# Patient Record
Sex: MALE | Race: WHITE | NOT HISPANIC OR LATINO | Employment: OTHER | URBAN - METROPOLITAN AREA
[De-identification: names, ages, dates, MRNs, and addresses within clinical notes are randomized per-mention and may not be internally consistent; named-entity substitution may affect disease eponyms.]

---

## 2017-06-08 ENCOUNTER — HOSPITAL ENCOUNTER (OUTPATIENT)
Facility: CLINIC | Age: 52
End: 2017-06-08
Attending: OPHTHALMOLOGY | Admitting: OPHTHALMOLOGY
Payer: COMMERCIAL

## 2017-07-17 RX ORDER — FLUTICASONE PROPIONATE 50 MCG
2 SPRAY, SUSPENSION (ML) NASAL DAILY
COMMUNITY

## 2017-07-17 RX ORDER — CETIRIZINE HYDROCHLORIDE 10 MG/1
10 TABLET ORAL DAILY
COMMUNITY

## 2017-07-17 RX ORDER — ALBUTEROL SULFATE 90 UG/1
2 AEROSOL, METERED RESPIRATORY (INHALATION) EVERY 4 HOURS PRN
COMMUNITY

## 2017-07-17 RX ORDER — HYDROCODONE BITARTRATE AND ACETAMINOPHEN 5; 325 MG/1; MG/1
1-2 TABLET ORAL EVERY 6 HOURS PRN
COMMUNITY

## 2017-07-18 VITALS — HEIGHT: 67 IN | BODY MASS INDEX: 28.62 KG/M2 | WEIGHT: 182.32 LBS

## 2017-07-20 ENCOUNTER — SURGERY (OUTPATIENT)
Age: 52
End: 2017-07-20

## 2017-07-20 ENCOUNTER — ANESTHESIA (OUTPATIENT)
Dept: SURGERY | Facility: CLINIC | Age: 52
End: 2017-07-20
Payer: COMMERCIAL

## 2017-07-20 ENCOUNTER — HOSPITAL ENCOUNTER (OUTPATIENT)
Facility: CLINIC | Age: 52
Discharge: HOME | End: 2017-07-20
Attending: OPHTHALMOLOGY | Admitting: OPHTHALMOLOGY
Payer: COMMERCIAL

## 2017-07-20 ENCOUNTER — ANESTHESIA EVENT (OUTPATIENT)
Dept: SURGERY | Facility: CLINIC | Age: 52
End: 2017-07-20
Payer: COMMERCIAL

## 2017-07-20 ENCOUNTER — APPOINTMENT (OUTPATIENT)
Dept: ADMISSION | Facility: CLINIC | Age: 52
End: 2017-07-20
Attending: OPHTHALMOLOGY
Payer: COMMERCIAL

## 2017-07-20 VITALS
BODY MASS INDEX: 28.56 KG/M2 | TEMPERATURE: 97.7 F | OXYGEN SATURATION: 98 % | DIASTOLIC BLOOD PRESSURE: 90 MMHG | HEIGHT: 67 IN | WEIGHT: 182 LBS | SYSTOLIC BLOOD PRESSURE: 124 MMHG | RESPIRATION RATE: 14 BRPM

## 2017-07-20 PROCEDURE — 36000135 ZZH KERATOTOMY ARCUATE W FEMTOSECOND LASER/IMAGING FOR ATIOL: Performed by: OPHTHALMOLOGY

## 2017-07-20 PROCEDURE — 71000028 ZZH EYE RECOVERY PHASE 2 EACH 15 MINS: Performed by: OPHTHALMOLOGY

## 2017-07-20 PROCEDURE — V2788 PRESBYOPIA-CORRECT FUNCTION: HCPCS | Performed by: OPHTHALMOLOGY

## 2017-07-20 PROCEDURE — 40000170 ZZH STATISTIC PRE-PROCEDURE ASSESSMENT II: Performed by: OPHTHALMOLOGY

## 2017-07-20 PROCEDURE — V2632 POST CHMBR INTRAOCULAR LENS: HCPCS | Performed by: OPHTHALMOLOGY

## 2017-07-20 PROCEDURE — 25000125 ZZHC RX 250: Performed by: OPHTHALMOLOGY

## 2017-07-20 PROCEDURE — 27210794 ZZH OR GENERAL SUPPLY STERILE: Performed by: OPHTHALMOLOGY

## 2017-07-20 PROCEDURE — 37000008 ZZH ANESTHESIA TECHNICAL FEE, 1ST 30 MIN: Performed by: OPHTHALMOLOGY

## 2017-07-20 PROCEDURE — 25000128 H RX IP 250 OP 636: Performed by: NURSE ANESTHETIST, CERTIFIED REGISTERED

## 2017-07-20 PROCEDURE — 25000128 H RX IP 250 OP 636: Performed by: ANESTHESIOLOGY

## 2017-07-20 PROCEDURE — 25000125 ZZHC RX 250: Performed by: NURSE ANESTHETIST, CERTIFIED REGISTERED

## 2017-07-20 PROCEDURE — 36000101 ZZH EYE SURGERY LEVEL 3 1ST 30 MIN: Performed by: OPHTHALMOLOGY

## 2017-07-20 PROCEDURE — 25000128 H RX IP 250 OP 636: Performed by: OPHTHALMOLOGY

## 2017-07-20 DEVICE — IMPLANTABLE DEVICE: Type: IMPLANTABLE DEVICE | Site: EYE | Status: FUNCTIONAL

## 2017-07-20 RX ORDER — PROPOFOL 10 MG/ML
INJECTION, EMULSION INTRAVENOUS PRN
Status: DISCONTINUED | OUTPATIENT
Start: 2017-07-20 | End: 2017-07-20

## 2017-07-20 RX ORDER — PROPARACAINE HYDROCHLORIDE 5 MG/ML
1 SOLUTION/ DROPS OPHTHALMIC ONCE
Status: COMPLETED | OUTPATIENT
Start: 2017-07-20 | End: 2017-07-20

## 2017-07-20 RX ORDER — DICLOFENAC SODIUM 1 MG/ML
1 SOLUTION/ DROPS OPHTHALMIC
Status: COMPLETED | OUTPATIENT
Start: 2017-07-20 | End: 2017-07-20

## 2017-07-20 RX ORDER — BALANCED SALT SOLUTION 6.4; .75; .48; .3; 3.9; 1.7 MG/ML; MG/ML; MG/ML; MG/ML; MG/ML; MG/ML
SOLUTION OPHTHALMIC PRN
Status: DISCONTINUED | OUTPATIENT
Start: 2017-07-20 | End: 2017-07-20 | Stop reason: HOSPADM

## 2017-07-20 RX ORDER — PROPARACAINE HYDROCHLORIDE 5 MG/ML
SOLUTION/ DROPS OPHTHALMIC PRN
Status: DISCONTINUED | OUTPATIENT
Start: 2017-07-20 | End: 2017-07-20 | Stop reason: HOSPADM

## 2017-07-20 RX ORDER — LIDOCAINE 40 MG/G
CREAM TOPICAL
Status: DISCONTINUED | OUTPATIENT
Start: 2017-07-20 | End: 2017-07-20 | Stop reason: HOSPADM

## 2017-07-20 RX ORDER — SODIUM CHLORIDE, SODIUM LACTATE, POTASSIUM CHLORIDE, CALCIUM CHLORIDE 600; 310; 30; 20 MG/100ML; MG/100ML; MG/100ML; MG/100ML
500 INJECTION, SOLUTION INTRAVENOUS CONTINUOUS
Status: DISCONTINUED | OUTPATIENT
Start: 2017-07-20 | End: 2017-07-20 | Stop reason: HOSPADM

## 2017-07-20 RX ORDER — TETRACAINE HYDROCHLORIDE 5 MG/ML
SOLUTION OPHTHALMIC PRN
Status: DISCONTINUED | OUTPATIENT
Start: 2017-07-20 | End: 2017-07-20 | Stop reason: HOSPADM

## 2017-07-20 RX ORDER — LIDOCAINE HYDROCHLORIDE 10 MG/ML
INJECTION, SOLUTION EPIDURAL; INFILTRATION; INTRACAUDAL; PERINEURAL PRN
Status: DISCONTINUED | OUTPATIENT
Start: 2017-07-20 | End: 2017-07-20 | Stop reason: HOSPADM

## 2017-07-20 RX ORDER — TROPICAMIDE 10 MG/ML
1 SOLUTION/ DROPS OPHTHALMIC
Status: COMPLETED | OUTPATIENT
Start: 2017-07-20 | End: 2017-07-20

## 2017-07-20 RX ORDER — MOXIFLOXACIN 5 MG/ML
1 SOLUTION/ DROPS OPHTHALMIC
Status: COMPLETED | OUTPATIENT
Start: 2017-07-20 | End: 2017-07-20

## 2017-07-20 RX ORDER — PHENYLEPHRINE HYDROCHLORIDE 25 MG/ML
1 SOLUTION/ DROPS OPHTHALMIC
Status: COMPLETED | OUTPATIENT
Start: 2017-07-20 | End: 2017-07-20

## 2017-07-20 RX ADMIN — BALANCED SALT SOLUTION 15 ML: 6.4; .75; .48; .3; 3.9; 1.7 SOLUTION OPHTHALMIC at 10:10

## 2017-07-20 RX ADMIN — PROPARACAINE HYDROCHLORIDE 2 DROP: 5 SOLUTION/ DROPS OPHTHALMIC at 09:41

## 2017-07-20 RX ADMIN — PROPOFOL 10 MG: 10 INJECTION, EMULSION INTRAVENOUS at 10:08

## 2017-07-20 RX ADMIN — PHENYLEPHRINE HYDROCHLORIDE 1 DROP: 2.5 SOLUTION/ DROPS OPHTHALMIC at 08:02

## 2017-07-20 RX ADMIN — PROPARACAINE HYDROCHLORIDE 2 DROP: 5 SOLUTION/ DROPS OPHTHALMIC at 09:47

## 2017-07-20 RX ADMIN — TROPICAMIDE 1 DROP: 10 SOLUTION/ DROPS OPHTHALMIC at 08:02

## 2017-07-20 RX ADMIN — TETRACAINE HYDROCHLORIDE 1 DROP: 5 SOLUTION OPHTHALMIC at 10:13

## 2017-07-20 RX ADMIN — LIDOCAINE HYDROCHLORIDE 1 ML: 10 INJECTION, SOLUTION EPIDURAL; INFILTRATION; INTRACAUDAL; PERINEURAL at 10:12

## 2017-07-20 RX ADMIN — EPINEPHRINE 500 ML: 1 INJECTION, SOLUTION, CONCENTRATE INTRAVENOUS at 10:12

## 2017-07-20 RX ADMIN — MOXIFLOXACIN HYDROCHLORIDE 1 DROP: 5 SOLUTION/ DROPS OPHTHALMIC at 08:09

## 2017-07-20 RX ADMIN — DICLOFENAC SODIUM 1 DROP: 1 SOLUTION/ DROPS OPHTHALMIC at 08:02

## 2017-07-20 RX ADMIN — MIDAZOLAM HYDROCHLORIDE 2 MG: 1 INJECTION, SOLUTION INTRAMUSCULAR; INTRAVENOUS at 10:11

## 2017-07-20 RX ADMIN — MOXIFLOXACIN HYDROCHLORIDE 1 DROP: 5 SOLUTION/ DROPS OPHTHALMIC at 08:03

## 2017-07-20 RX ADMIN — BALANCED SALT SOLUTION 15 ML: 6.4; .75; .48; .3; 3.9; 1.7 SOLUTION OPHTHALMIC at 09:46

## 2017-07-20 RX ADMIN — EPINEPHRINE 0.5 ML: 1 INJECTION, SOLUTION, CONCENTRATE INTRAVENOUS at 10:12

## 2017-07-20 RX ADMIN — MOXIFLOXACIN HYDROCHLORIDE 1 DROP: 5 SOLUTION/ DROPS OPHTHALMIC at 07:49

## 2017-07-20 RX ADMIN — PROPARACAINE HYDROCHLORIDE 1 DROP: 5 SOLUTION/ DROPS OPHTHALMIC at 07:48

## 2017-07-20 RX ADMIN — DICLOFENAC SODIUM 1 DROP: 1 SOLUTION/ DROPS OPHTHALMIC at 08:09

## 2017-07-20 RX ADMIN — PHENYLEPHRINE HYDROCHLORIDE 1 DROP: 2.5 SOLUTION/ DROPS OPHTHALMIC at 08:09

## 2017-07-20 RX ADMIN — TROPICAMIDE 1 DROP: 10 SOLUTION/ DROPS OPHTHALMIC at 07:48

## 2017-07-20 RX ADMIN — PROPOFOL 30 MG: 10 INJECTION, EMULSION INTRAVENOUS at 09:59

## 2017-07-20 RX ADMIN — SODIUM CHLORIDE, SODIUM LACTATE, POTASSIUM CHLORIDE, CALCIUM CHLORIDE: 600; 310; 30; 20 INJECTION, SOLUTION INTRAVENOUS at 09:55

## 2017-07-20 RX ADMIN — Medication 0.8 ML: at 10:14

## 2017-07-20 RX ADMIN — TROPICAMIDE 1 DROP: 10 SOLUTION/ DROPS OPHTHALMIC at 08:09

## 2017-07-20 RX ADMIN — DICLOFENAC SODIUM 1 DROP: 1 SOLUTION/ DROPS OPHTHALMIC at 07:48

## 2017-07-20 RX ADMIN — PROPOFOL 10 MG: 10 INJECTION, EMULSION INTRAVENOUS at 10:06

## 2017-07-20 RX ADMIN — MIDAZOLAM HYDROCHLORIDE 2 MG: 1 INJECTION, SOLUTION INTRAMUSCULAR; INTRAVENOUS at 09:55

## 2017-07-20 RX ADMIN — PHENYLEPHRINE HYDROCHLORIDE 1 DROP: 2.5 SOLUTION/ DROPS OPHTHALMIC at 07:48

## 2017-07-20 NOTE — ANESTHESIA POSTPROCEDURE EVALUATION
Patient: Placido Dickerson    Procedure(s):  LEFT EYE FEMTOSECOND LASER ASSISTED, PHACOEMULSIFICATION CLEAR CORNEA WITH DELUXE  INTRAOCULAR LENS IMPLANT  - Wound Class: I-Clean    Diagnosis:LEFT EYE CATARACT  Diagnosis Additional Information: No value filed.    Anesthesia Type:  MAC    Note:  Anesthesia Post Evaluation    Patient location during evaluation: PACU  Patient participation: Able to fully participate in evaluation  Level of consciousness: awake  Airway patency: patent  Cardiovascular status: acceptable  Respiratory status: acceptable  Hydration status: acceptable     Anesthetic complications: None          Last vitals:  Vitals:    07/20/17 0754 07/20/17 1023 07/20/17 1049   BP: (!) 140/98 (!) 125/98 124/90   Resp: 16 12 14   Temp: 36.5  C (97.7  F)     SpO2: 99% 97% 98%         Electronically Signed By: Daija Stewart  July 20, 2017  3:29 PM

## 2017-07-20 NOTE — ANESTHESIA PREPROCEDURE EVALUATION
Anesthesia Evaluation     . Pt has had prior anesthetic.     No history of anesthetic complications          ROS/MED HX    ENT/Pulmonary:     (+)asthma , . .    Neurologic:     (+)neuropathy     Cardiovascular:     (+) --CAD, -past MI (subendocardial mi, stent, no current symptoms, occured in 2010),-stent,. : . . . :. .       METS/Exercise Tolerance:     Hematologic:         Musculoskeletal:         GI/Hepatic:     (+) GERD Symptomatic,       Renal/Genitourinary:         Endo:         Psychiatric:         Infectious Disease:         Malignancy:         Other: Comment: Patient denies allergy to lidocaine, gets at dentist all the time                    Physical Exam  Normal systems: dental    Airway   Mallampati: II  TM distance: >3 FB  Neck ROM: full    Dental     Cardiovascular   Rhythm and rate: regular      Pulmonary    breath sounds clear to auscultation                    Anesthesia Plan      History & Physical Review  History and physical reviewed and following examination; no interval change.    ASA Status:  3 .        Plan for MAC with Intravenous induction. Reason for MAC:  Procedure to face, neck, head or breast  PONV prophylaxis:  Ondansetron (or other 5HT-3)  Able to lie flat    Hx of reflux, poorly controlled.  No nighttime symptoms but does have morning symptoms, able to lie flat.  Told patient we would do sedation, but he would be semi awake    Patient okay with plan      Postoperative Care      Consents                          .

## 2017-07-20 NOTE — OR NURSING
"Pt very sleepy upon arrival to Phase II due to extra sedation her received in OR for restlessness.  Pt is still sleepy but mostly awake and appropriated and stated \"I just want to leave\" when asked if he wanted to eat now that he is awake or go home.  "

## 2017-07-20 NOTE — DISCHARGE INSTRUCTIONS
"Cataract Surgery Postoperative Instructions  Dr. Jay Acharya      Postoperative Medications: After surgery, you will use eye drop medications. In most cases, you will start these eye drops 2 days before the day of surgery.   Follow the directions provided to you from the pharmacy or from the doctor's office.    The drops might sting a little when they are instilled, and that is normal.    It doesn't matter what order you put the drops into your eyes, but you should wait at least one minute between drops.    Recently we started using a compounded drop that contains all three prescriptions in one bottle. If you have this drop you only need to use one drop 4 time per day. Many people choose to do the drops at breakfast, lunch, dinner, and bedtime.    Artificial Tears- Are lubricating drops used to moisturize the eye.  You can use these as much as you want.  Particularly if your eyes feel watery, gritty, or uncomfortable.  Chilling these drops in the refrigerator results in a more soothing feeling.  There are several brands of artificial tears available including, but not limited to, Optive, Refresh, Systane, Blink, Genteal, Soothe, and others.  You should not use drops that are \"gets the red out.\"  You do not need a prescription for these medications.     Please continue any glaucoma, dry eye, or other medications you were using prior to the surgery.      Please allow 24 to 48 hours when requesting refills, and call BEFORE you run out of drops.    Restriction on Activities-  It is extremely important that you DO NOT RUB THE TREATED EYE.    - You will be given a clear plastic shield to wear as protection over your eye the night after surgery.    - Refrain from many activities that may put your eye at risk of injury, as well as areas containing a high volume of chemicals, dust, and debris.    - Do Not wear any eye makeup or moisturizer around the eye for 1 week after surgery.    - Do Not swim or go into a hot-tub, " jacuzzi, or sauna for 1 week following surgery.  You can take showers as normal, but avoid getting shampoo or soap into your eyes.     - Avoid strenuous activity, including lifting more than 10 pounds, for 1 week after surgery.       - It is fine to bathe, read, watch TV, and use the computer.    Symptoms requiring medical attention:     - Sudden onset of increased discharge from the eye.  - Persistent or increasing pain in the eye.  - Sudden decrease in vision.  - Persistent nausea or vomiting.      If you have any questions or concerns before or after your surgery, please contact Dr. Acharya's office at [phone].         Revised 3/27/2017    [facility] Discharge  Instructions  Anesthesia ([facility])   Adult Discharge Instructions    For 24 hours after surgery    1. Get plenty of rest.  Make arrangements to have a responsible adult stay with you for at least 6 hours after you leave the hospital.  2. Do not drive or use heavy equipment for 24 hours.    3. Do not drink alcohol for 24 hours.  4. Do not sign legal documents or make important decisions for 24 hours.  5. Avoid strenuous or risky activities. You may feel lightheaded.  If so, sit for a few minutes before standing.  Have someone help you get up.   6. Conscious sedation patients may resume a regular diet..  7. Any questions of medical nature, call your physician.

## 2017-07-20 NOTE — ANESTHESIA CARE TRANSFER NOTE
Patient: Placido Dickerson    Procedure(s):  LEFT EYE FEMTOSECOND LASER ASSISTED, PHACOEMULSIFICATION CLEAR CORNEA WITH DELUXE  INTRAOCULAR LENS IMPLANT  - Wound Class: I-Clean    Diagnosis: LEFT EYE CATARACT  Diagnosis Additional Information: No value filed.    Anesthesia Type:   MAC     Note:  Airway :Room Air  Patient transferred to:PACU  Comments: Transferred to [facility] recovery room in recliner with armrests up, spontaneous respirations, O2 saturation maintained on RA. All monitors and alarms on and functioning, clinically stable vital signs. Report given to recovery RN and questions answered. Patient alert and following verbal directions.      Vitals: (Last set prior to Anesthesia Care Transfer)    CRNA VITALS  7/20/2017 0949 - 7/20/2017 1020      7/20/2017             Ht Rate: 77    Resp Rate (set): 10                Electronically Signed By: ANDREA Starr CRNA  July 20, 2017  10:20 AM

## 2017-07-20 NOTE — OP NOTE
PATIENT NAME:  Placido Dickerson    :  1965    PATIENT NUMBER:  [id number]    DATE OF SURGERY:  2017    SURGEON:  Jay Acharya M.D.    PREOPERATIVE DIAGNOSIS:   1. Cataract left eye  2. Astigmatism left eye  3. 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 Phacoemulusification of cataract with intraocular lens implant left eye.  2. Femtosecond LASER incisions (astigmatic correction) for cataract surgery left eye and nuclear softening.  3. Posterior synechiolysis    DETAILS: The patient was brought to the LASER suite. In a supine position the head was secured and the docking apparatus was applied to the eye. When good suction was achieved BSS was added to the well. The chair was then positioned underneath the LASER and docking was successfully achieved. OCT scanning was initiated and approved. The LASER was then used to deliver the desired incisions. See scanned paper note for LASER parameters.     The patient was then moved to the operative suite in stable condition where the eye was prepped and draped in the usual sterile fashion.  A lid speculum was applied. A super sharp blade was used to create a paracentesis, through which 1% preservative free Lidocaine was injected.  Visoelastic was then used to inflate the anterior chamber.  A biplanar incision at the temporal limbus was created with a 2.4 mm keratome or if the main wound was made with the Femtosecond LASER it was bluntly dissected.  The posterior synechia was bluntly dissected with the viscoelastic cannula.  The lens was hydrodissected and hydro delineated using BSS on a cannula.  The lens nucleus was removed using phacoemulsification.  Remaining cortex was removed using irrigation and aspiration.  Viscoelastic was injected to inflate the capsular bag and a 23.0 D ZXR00 IOL (presbyopia correcting) was inserted into the capsular bag without difficulty.  The lens was easily  rotated using a Sinskey hook and good centration was noted.  Residual viscoelastic and provisc material was removed with irrigation and aspiration.  BSS was used to hydrate the corneal incision and paracentesis sites which were checked and noted to be watertight.  Tobradex ointment was applied to the eye and a clear plastic shield was placed.  The patient tolerated the procedure well and left the operative suite in stable condition.    Jay Acharya MD

## 2017-07-20 NOTE — IP AVS SNAPSHOT
[facility]    [address]    [place] [address]    Phone:  [phone]    Fax:  [phone]                                       After Visit Summary   7/20/2017    Placido Dickerson    MRN: [id number]           After Visit Summary Signature Page     I have received my discharge instructions, and my questions have been answered. I have discussed any challenges I see with this plan with the nurse or doctor.    ..........................................................................................................................................  Patient/Patient Representative Signature      ..........................................................................................................................................  Patient Representative Print Name and Relationship to Patient    ..................................................               ................................................  Date                                            Time    ..........................................................................................................................................  Reviewed by Signature/Title    ...................................................              ..............................................  Date                                                            Time

## 2017-07-20 NOTE — IP AVS SNAPSHOT
MRN:[id number]                      After Visit Summary   7/20/2017    Placido Dickerson    MRN: [id number]           Thank you!     Thank you for choosing [facility] for your care. Our goal is always to provide you with excellent care. Hearing back from our patients is one way we can continue to improve our services. Please take a few minutes to complete the written survey that you may receive in the mail after you visit with us. Thank you!        Patient Information     Date Of Birth          1965        About your hospital stay     You were admitted on:  July 20, 2017 You last received care in the:  [facility]    You were discharged on:  July 20, 2017       Who to Call     For medical emergencies, please call 911.  For non-urgent questions about your medical care, please call your primary care provider or clinic, None  For questions related to your surgery, please call your surgery clinic        Attending Provider     Provider Jay Pastor MD Ophthalmology       Primary Care Provider    None      Your next 10 appointments already scheduled     Aug 03, 2017   Procedure with Jay Acharya MD   [facility] (--)    [address]  [facility] [address]   [phone]            Aug 03, 2017   Procedure with Jay Acharya MD   [facility] (--)    [address]  [facility] [address]   [phone]              Further instructions from your care team       Cataract Surgery Postoperative Instructions  Dr. Jay Acharya      Postoperative Medications: After surgery, you will use eye drop medications. In most cases, you will start these eye drops 2 days before the day of surgery.   Follow the directions provided to you from the pharmacy or from the doctor's office.    The drops might sting a little when they are instilled, and that is normal.    It doesn't matter what  "order you put the drops into your eyes, but you should wait at least one minute between drops.    Recently we started using a compounded drop that contains all three prescriptions in one bottle. If you have this drop you only need to use one drop 4 time per day. Many people choose to do the drops at breakfast, lunch, dinner, and bedtime.    Artificial Tears- Are lubricating drops used to moisturize the eye.  You can use these as much as you want.  Particularly if your eyes feel watery, gritty, or uncomfortable.  Chilling these drops in the refrigerator results in a more soothing feeling.  There are several brands of artificial tears available including, but not limited to, Optive, Refresh, Systane, Blink, Genteal, Soothe, and others.  You should not use drops that are \"gets the red out.\"  You do not need a prescription for these medications.     Please continue any glaucoma, dry eye, or other medications you were using prior to the surgery.      Please allow 24 to 48 hours when requesting refills, and call BEFORE you run out of drops.    Restriction on Activities-  It is extremely important that you DO NOT RUB THE TREATED EYE.    - You will be given a clear plastic shield to wear as protection over your eye the night after surgery.    - Refrain from many activities that may put your eye at risk of injury, as well as areas containing a high volume of chemicals, dust, and debris.    - Do Not wear any eye makeup or moisturizer around the eye for 1 week after surgery.    - Do Not swim or go into a hot-tub, jacuzzi, or sauna for 1 week following surgery.  You can take showers as normal, but avoid getting shampoo or soap into your eyes.     - Avoid strenuous activity, including lifting more than 10 pounds, for 1 week after surgery.       - It is fine to bathe, read, watch TV, and use the computer.    Symptoms requiring medical attention:     - Sudden onset of increased discharge from the eye.  - Persistent or increasing " "pain in the eye.  - Sudden decrease in vision.  - Persistent nausea or vomiting.      If you have any questions or concerns before or after your surgery, please contact Dr. Acharya's office at [phone].         Revised 3/27/2017    [facility] Discharge  Instructions  Anesthesia ([facility] Center)   Adult Discharge Instructions    For 24 hours after surgery    1. Get plenty of rest.  Make arrangements to have a responsible adult stay with you for at least 6 hours after you leave the hospital.  2. Do not drive or use heavy equipment for 24 hours.    3. Do not drink alcohol for 24 hours.  4. Do not sign legal documents or make important decisions for 24 hours.  5. Avoid strenuous or risky activities. You may feel lightheaded.  If so, sit for a few minutes before standing.  Have someone help you get up.   6. Conscious sedation patients may resume a regular diet..  7. Any questions of medical nature, call your physician.    Pending Results     No orders found from 7/18/2017 to 7/21/2017.            Admission Information     Date & Time Provider Department Dept. Phone    7/20/2017 Jay Acharya MD [facility] [place] [phone]      Your Vitals Were     Blood Pressure Temperature Respirations Height Weight Pulse Oximetry    125/98 97.7  F (36.5  C) (Temporal) 12 1.702 m (5' 7.01\") 82.6 kg (182 lb) 97%    BMI (Body Mass Index)                   28.51 kg/m2           [employer] Information     [employer] lets you send messages to your doctor, view your test results, renew your prescriptions, schedule appointments and more. To sign up, go to [email or web address] . Click on \"Log in\" on the left side of the screen, which will take you to the Welcome page. Then click on \"Sign up Now\" on the right side of the page.     You will be asked to enter the access code listed below, as well as some personal information. Please follow the directions to create your username " and password.     Your access code is: [email or web address]  Expires: 10/18/2017 10:24 AM     Your access code will  in 90 days. If you need help or a new code, please call your [facility] clinic or [phone].        Care EveryWhere ID     This is your Care EveryWhere ID. This could be used by other organizations to access your [facility] medical records  [id number]        Equal Access to Services     JUMANA MOSLEY : Hadii aad ku hadasho Soomaali, waaxda luqadaha, qaybta kaalmada adeegyada, waxay idiin hayaan adeeg cheriejesseniasom varner . So [facility] [phone].    ATENCIÓN: Si habla español, tiene a chong disposición servicios gratuitos de asistencia lingüística. Llame al [phone].    We comply with applicable federal civil rights laws and [place] laws. We do not discriminate on the basis of race, color, national origin, age, disability sex, sexual orientation or gender identity.               Review of your medicines      UNREVIEWED medicines. Ask your doctor about these medicines        Dose / Directions    adalimumab 40 MG/0.8ML prefilled syringe kit   Commonly known as:  HUMIRA        Dose:  40 mg   Inject 40 mg Subcutaneous every 14 days   Refills:  0       albuterol 108 (90 BASE) MCG/ACT Inhaler   Commonly known as:  PROAIR HFA/PROVENTIL HFA/VENTOLIN HFA        Dose:  2 puff   Inhale 2 puffs into the lungs every 4 hours as needed for shortness of breath / dyspnea or wheezing   Refills:  0       aspirin 81 MG EC tablet        Dose:  81 mg   Take 81 mg by mouth daily   Refills:  0       cetirizine 10 MG tablet   Commonly known as:  zyrTEC        Dose:  10 mg   Take 10 mg by mouth daily   Refills:  0       fluticasone 50 MCG/ACT spray   Commonly known as:  FLONASE        Dose:  2 spray   Spray 2 sprays into both nostrils daily   Refills:  0       FOLIC ACID PO        Dose:  1 mg   Take 1 mg by mouth daily   Refills:  0       HYDROcodone-acetaminophen 5-325 MG per tablet   Commonly known as:  NORCO        Dose:  1-2  tablet   Take 1-2 tablets by mouth every 6 hours as needed for moderate to severe pain   Refills:  0       METHOTREXATE PO   Indication:  Rheumatoid Arthritis        Dose:  2.5 mg   Take 2.5 mg by mouth 6/week all at one time once a week with dinner   Refills:  0       NITROSTAT SL        Dose:  0.4 mg   Place 0.4 mg under the tongue Place 1 tablet under the tongue every 5 minutes if needed for chest pain (x 3 doses).   Refills:  0       TOPROL XL PO        Dose:  25 mg   Take 25 mg by mouth daily   Refills:  0       ZOFRAN PO        Dose:  8 mg   Take 8 mg by mouth every 8 hours as needed for nausea or vomiting   Refills:  0                Protect others around you: Learn how to safely use, store and throw away your medicines at www.disposemymeds.org.             Medication List: This is a list of all your medications and when to take them. Check marks below indicate your daily home schedule. Keep this list as a reference.      Medications           Morning Afternoon Evening Bedtime As Needed    adalimumab 40 MG/0.8ML prefilled syringe kit   Commonly known as:  HUMIRA   Inject 40 mg Subcutaneous every 14 days                                albuterol 108 (90 BASE) MCG/ACT Inhaler   Commonly known as:  PROAIR HFA/PROVENTIL HFA/VENTOLIN HFA   Inhale 2 puffs into the lungs every 4 hours as needed for shortness of breath / dyspnea or wheezing                                aspirin 81 MG EC tablet   Take 81 mg by mouth daily                                cetirizine 10 MG tablet   Commonly known as:  zyrTEC   Take 10 mg by mouth daily                                fluticasone 50 MCG/ACT spray   Commonly known as:  FLONASE   Spray 2 sprays into both nostrils daily                                FOLIC ACID PO   Take 1 mg by mouth daily                                HYDROcodone-acetaminophen 5-325 MG per tablet   Commonly known as:  NORCO   Take 1-2 tablets by mouth every 6 hours as needed for moderate to severe pain                                 METHOTREXATE PO   Take 2.5 mg by mouth 6/week all at one time once a week with dinner                                NITROSTAT SL   Place 0.4 mg under the tongue Place 1 tablet under the tongue every 5 minutes if needed for chest pain (x 3 doses).                                TOPROL XL PO   Take 25 mg by mouth daily                                ZOFRAN PO   Take 8 mg by mouth every 8 hours as needed for nausea or vomiting

## 2017-08-02 VITALS — HEIGHT: 67 IN | WEIGHT: 182.4 LBS | BODY MASS INDEX: 28.63 KG/M2

## 2017-08-03 ENCOUNTER — ANESTHESIA EVENT (OUTPATIENT)
Dept: SURGERY | Facility: CLINIC | Age: 52
End: 2017-08-03
Payer: COMMERCIAL

## 2017-08-03 ENCOUNTER — SURGERY (OUTPATIENT)
Age: 52
End: 2017-08-03

## 2017-08-03 ENCOUNTER — APPOINTMENT (OUTPATIENT)
Dept: ADMISSION | Facility: CLINIC | Age: 52
End: 2017-08-03
Attending: OPHTHALMOLOGY
Payer: COMMERCIAL

## 2017-08-03 ENCOUNTER — ANESTHESIA (OUTPATIENT)
Dept: SURGERY | Facility: CLINIC | Age: 52
End: 2017-08-03
Payer: COMMERCIAL

## 2017-08-03 ENCOUNTER — HOSPITAL ENCOUNTER (OUTPATIENT)
Facility: CLINIC | Age: 52
Discharge: HOME | End: 2017-08-03
Attending: OPHTHALMOLOGY | Admitting: OPHTHALMOLOGY
Payer: COMMERCIAL

## 2017-08-03 VITALS
BODY MASS INDEX: 28.51 KG/M2 | HEART RATE: 88 BPM | RESPIRATION RATE: 16 BRPM | SYSTOLIC BLOOD PRESSURE: 125 MMHG | OXYGEN SATURATION: 96 % | TEMPERATURE: 98.3 F | DIASTOLIC BLOOD PRESSURE: 86 MMHG | WEIGHT: 182 LBS

## 2017-08-03 PROCEDURE — 37000008 ZZH ANESTHESIA TECHNICAL FEE, 1ST 30 MIN: Performed by: OPHTHALMOLOGY

## 2017-08-03 PROCEDURE — V2632 POST CHMBR INTRAOCULAR LENS: HCPCS | Performed by: OPHTHALMOLOGY

## 2017-08-03 PROCEDURE — 36000135 ZZH KERATOTOMY ARCUATE W FEMTOSECOND LASER/IMAGING FOR ATIOL: Performed by: OPHTHALMOLOGY

## 2017-08-03 PROCEDURE — 71000028 ZZH EYE RECOVERY PHASE 2 EACH 15 MINS: Performed by: OPHTHALMOLOGY

## 2017-08-03 PROCEDURE — 25000125 ZZHC RX 250: Performed by: NURSE ANESTHETIST, CERTIFIED REGISTERED

## 2017-08-03 PROCEDURE — 25000125 ZZHC RX 250: Performed by: OPHTHALMOLOGY

## 2017-08-03 PROCEDURE — 25000128 H RX IP 250 OP 636: Performed by: ANESTHESIOLOGY

## 2017-08-03 PROCEDURE — 40000170 ZZH STATISTIC PRE-PROCEDURE ASSESSMENT II: Performed by: OPHTHALMOLOGY

## 2017-08-03 PROCEDURE — V2788 PRESBYOPIA-CORRECT FUNCTION: HCPCS | Performed by: OPHTHALMOLOGY

## 2017-08-03 PROCEDURE — 25000128 H RX IP 250 OP 636: Performed by: NURSE ANESTHETIST, CERTIFIED REGISTERED

## 2017-08-03 PROCEDURE — 36000101 ZZH EYE SURGERY LEVEL 3 1ST 30 MIN: Performed by: OPHTHALMOLOGY

## 2017-08-03 PROCEDURE — 25000128 H RX IP 250 OP 636: Performed by: OPHTHALMOLOGY

## 2017-08-03 PROCEDURE — 27210794 ZZH OR GENERAL SUPPLY STERILE: Performed by: OPHTHALMOLOGY

## 2017-08-03 DEVICE — IMPLANTABLE DEVICE: Type: IMPLANTABLE DEVICE | Site: EYE | Status: FUNCTIONAL

## 2017-08-03 RX ORDER — LIDOCAINE HYDROCHLORIDE 10 MG/ML
INJECTION, SOLUTION EPIDURAL; INFILTRATION; INTRACAUDAL; PERINEURAL PRN
Status: DISCONTINUED | OUTPATIENT
Start: 2017-08-03 | End: 2017-08-03 | Stop reason: HOSPADM

## 2017-08-03 RX ORDER — TROPICAMIDE 10 MG/ML
1 SOLUTION/ DROPS OPHTHALMIC
Status: DISCONTINUED | OUTPATIENT
Start: 2017-08-03 | End: 2017-08-03 | Stop reason: HOSPADM

## 2017-08-03 RX ORDER — SODIUM CHLORIDE, SODIUM LACTATE, POTASSIUM CHLORIDE, CALCIUM CHLORIDE 600; 310; 30; 20 MG/100ML; MG/100ML; MG/100ML; MG/100ML
500 INJECTION, SOLUTION INTRAVENOUS CONTINUOUS
Status: DISCONTINUED | OUTPATIENT
Start: 2017-08-03 | End: 2017-08-03 | Stop reason: HOSPADM

## 2017-08-03 RX ORDER — PHENYLEPHRINE HYDROCHLORIDE 25 MG/ML
1 SOLUTION/ DROPS OPHTHALMIC
Status: DISCONTINUED | OUTPATIENT
Start: 2017-08-03 | End: 2017-08-03 | Stop reason: HOSPADM

## 2017-08-03 RX ORDER — PROPARACAINE HYDROCHLORIDE 5 MG/ML
1 SOLUTION/ DROPS OPHTHALMIC ONCE
Status: COMPLETED | OUTPATIENT
Start: 2017-08-03 | End: 2017-08-03

## 2017-08-03 RX ORDER — PROPARACAINE HYDROCHLORIDE 5 MG/ML
1 SOLUTION/ DROPS OPHTHALMIC ONCE
Status: DISCONTINUED | OUTPATIENT
Start: 2017-08-03 | End: 2017-08-03 | Stop reason: HOSPADM

## 2017-08-03 RX ORDER — PHENYLEPHRINE HYDROCHLORIDE 25 MG/ML
1 SOLUTION/ DROPS OPHTHALMIC
Status: COMPLETED | OUTPATIENT
Start: 2017-08-03 | End: 2017-08-03

## 2017-08-03 RX ORDER — BALANCED SALT SOLUTION 6.4; .75; .48; .3; 3.9; 1.7 MG/ML; MG/ML; MG/ML; MG/ML; MG/ML; MG/ML
SOLUTION OPHTHALMIC PRN
Status: DISCONTINUED | OUTPATIENT
Start: 2017-08-03 | End: 2017-08-03 | Stop reason: HOSPADM

## 2017-08-03 RX ORDER — DICLOFENAC SODIUM 1 MG/ML
1 SOLUTION/ DROPS OPHTHALMIC
Status: DISCONTINUED | OUTPATIENT
Start: 2017-08-03 | End: 2017-08-03 | Stop reason: HOSPADM

## 2017-08-03 RX ORDER — MOXIFLOXACIN 5 MG/ML
1 SOLUTION/ DROPS OPHTHALMIC
Status: DISCONTINUED | OUTPATIENT
Start: 2017-08-03 | End: 2017-08-03 | Stop reason: HOSPADM

## 2017-08-03 RX ORDER — DICLOFENAC SODIUM 1 MG/ML
1 SOLUTION/ DROPS OPHTHALMIC
Status: COMPLETED | OUTPATIENT
Start: 2017-08-03 | End: 2017-08-03

## 2017-08-03 RX ORDER — TETRACAINE HYDROCHLORIDE 5 MG/ML
SOLUTION OPHTHALMIC PRN
Status: DISCONTINUED | OUTPATIENT
Start: 2017-08-03 | End: 2017-08-03 | Stop reason: HOSPADM

## 2017-08-03 RX ORDER — PROPARACAINE HYDROCHLORIDE 5 MG/ML
SOLUTION/ DROPS OPHTHALMIC PRN
Status: DISCONTINUED | OUTPATIENT
Start: 2017-08-03 | End: 2017-08-03 | Stop reason: HOSPADM

## 2017-08-03 RX ORDER — MOXIFLOXACIN 5 MG/ML
1 SOLUTION/ DROPS OPHTHALMIC
Status: COMPLETED | OUTPATIENT
Start: 2017-08-03 | End: 2017-08-03

## 2017-08-03 RX ORDER — TROPICAMIDE 10 MG/ML
1 SOLUTION/ DROPS OPHTHALMIC
Status: COMPLETED | OUTPATIENT
Start: 2017-08-03 | End: 2017-08-03

## 2017-08-03 RX ADMIN — DICLOFENAC SODIUM 1 DROP: 1 SOLUTION/ DROPS OPHTHALMIC at 13:40

## 2017-08-03 RX ADMIN — SODIUM CHLORIDE, SODIUM LACTATE, POTASSIUM CHLORIDE, CALCIUM CHLORIDE: 600; 310; 30; 20 INJECTION, SOLUTION INTRAVENOUS at 14:08

## 2017-08-03 RX ADMIN — BALANCED SALT SOLUTION 1 APPLICATOR: 6.4; .75; .48; .3; 3.9; 1.7 SOLUTION OPHTHALMIC at 14:02

## 2017-08-03 RX ADMIN — TROPICAMIDE 1 DROP: 10 SOLUTION/ DROPS OPHTHALMIC at 13:40

## 2017-08-03 RX ADMIN — MOXIFLOXACIN HYDROCHLORIDE 1 DROP: 5 SOLUTION/ DROPS OPHTHALMIC at 13:44

## 2017-08-03 RX ADMIN — TROPICAMIDE 1 DROP: 10 SOLUTION/ DROPS OPHTHALMIC at 13:34

## 2017-08-03 RX ADMIN — EPINEPHRINE 0.5 ML: 1 INJECTION, SOLUTION, CONCENTRATE INTRAVENOUS at 14:17

## 2017-08-03 RX ADMIN — DICLOFENAC SODIUM 1 DROP: 1 SOLUTION/ DROPS OPHTHALMIC at 13:43

## 2017-08-03 RX ADMIN — PHENYLEPHRINE HYDROCHLORIDE 1 DROP: 2.5 SOLUTION/ DROPS OPHTHALMIC at 13:43

## 2017-08-03 RX ADMIN — PROPARACAINE HYDROCHLORIDE 1 DROP: 5 SOLUTION/ DROPS OPHTHALMIC at 13:55

## 2017-08-03 RX ADMIN — Medication 0.8 ML: at 14:18

## 2017-08-03 RX ADMIN — TROPICAMIDE 1 DROP: 10 SOLUTION/ DROPS OPHTHALMIC at 13:43

## 2017-08-03 RX ADMIN — MOXIFLOXACIN HYDROCHLORIDE 1 DROP: 5 SOLUTION/ DROPS OPHTHALMIC at 13:33

## 2017-08-03 RX ADMIN — PHENYLEPHRINE HYDROCHLORIDE 1 DROP: 2.5 SOLUTION/ DROPS OPHTHALMIC at 13:39

## 2017-08-03 RX ADMIN — TETRACAINE HYDROCHLORIDE 2 DROP: 5 SOLUTION OPHTHALMIC at 14:18

## 2017-08-03 RX ADMIN — DICLOFENAC SODIUM 1 DROP: 1 SOLUTION/ DROPS OPHTHALMIC at 13:33

## 2017-08-03 RX ADMIN — PHENYLEPHRINE HYDROCHLORIDE 1 DROP: 2.5 SOLUTION/ DROPS OPHTHALMIC at 13:32

## 2017-08-03 RX ADMIN — MIDAZOLAM HYDROCHLORIDE 2 MG: 1 INJECTION, SOLUTION INTRAMUSCULAR; INTRAVENOUS at 14:12

## 2017-08-03 RX ADMIN — MOXIFLOXACIN HYDROCHLORIDE 1 DROP: 5 SOLUTION/ DROPS OPHTHALMIC at 13:40

## 2017-08-03 RX ADMIN — DEXMEDETOMIDINE HYDROCHLORIDE 20 MCG: 100 INJECTION, SOLUTION INTRAVENOUS at 14:08

## 2017-08-03 RX ADMIN — BALANCED SALT SOLUTION 15 ML: 6.4; .75; .48; .3; 3.9; 1.7 SOLUTION OPHTHALMIC at 14:17

## 2017-08-03 RX ADMIN — MIDAZOLAM HYDROCHLORIDE 2 MG: 1 INJECTION, SOLUTION INTRAMUSCULAR; INTRAVENOUS at 14:08

## 2017-08-03 RX ADMIN — PROPARACAINE HYDROCHLORIDE 1 DROP: 5 SOLUTION/ DROPS OPHTHALMIC at 13:32

## 2017-08-03 RX ADMIN — LIDOCAINE HYDROCHLORIDE 1 ML: 10 INJECTION, SOLUTION EPIDURAL; INFILTRATION; INTRACAUDAL; PERINEURAL at 14:18

## 2017-08-03 RX ADMIN — EPINEPHRINE 500 ML: 1 INJECTION, SOLUTION, CONCENTRATE INTRAVENOUS at 14:17

## 2017-08-03 ASSESSMENT — LIFESTYLE VARIABLES: TOBACCO_USE: 0

## 2017-08-03 ASSESSMENT — ENCOUNTER SYMPTOMS: DYSRHYTHMIAS: 0

## 2017-08-03 ASSESSMENT — COPD QUESTIONNAIRES: COPD: 0

## 2017-08-03 NOTE — ANESTHESIA CARE TRANSFER NOTE
Patient: Placido Dickerson    Procedure(s):  RIGHT EYE FEMTOSECOND LASER ASSISTED, PHACOEMULSIFICATION CLEAR CORNEA WITH DELUXE INTRAOCULAR LENS IMPLANT  - Wound Class: I-Clean    Diagnosis: RIGHT EYE CATARACT  Diagnosis Additional Information: No value filed.    Anesthesia Type:   MAC     Note:  Airway :Room Air  Patient transferred to:PACU  Comments: VSS      Vitals: (Last set prior to Anesthesia Care Transfer)    CRNA VITALS  8/3/2017 1357 - 8/3/2017 1429      8/3/2017             Pulse: 78    Ht Rate: 71    SpO2: 98 %    Resp Rate (set): 10                Electronically Signed By: ANDREA Juan CRNA  August 3, 2017  2:29 PM

## 2017-08-03 NOTE — IP AVS SNAPSHOT
MRN:[id number]                      After Visit Summary   8/3/2017    Placido Dickerson    MRN: [id number]           Thank you!     Thank you for choosing [facility] for your care. Our goal is always to provide you with excellent care. Hearing back from our patients is one way we can continue to improve our services. Please take a few minutes to complete the written survey that you may receive in the mail after you visit with us. Thank you!        Patient Information     Date Of Birth          1965        About your hospital stay     You were admitted on:  August 3, 2017 You last received care in the:  [facility]    You were discharged on:  August 3, 2017       Who to Call     For medical emergencies, please call 911.  For non-urgent questions about your medical care, please call your primary care provider or clinic, None  For questions related to your surgery, please call your surgery clinic        Attending Provider     Provider Jay Pastor MD Ophthalmology       Primary Care Provider    None      Further instructions from your care team       [facility] Discharge  Instructions  Anesthesia ([facility])   Adult Discharge Instructions    For 24 hours after surgery    1. Get plenty of rest.  Make arrangements to have a responsible adult stay with you for at least 6 hours after you leave the hospital.  2. Do not drive or use heavy equipment for 24 hours.    3. Do not drink alcohol for 24 hours.  4. Do not sign legal documents or make important decisions for 24 hours.  5. Avoid strenuous or risky activities. You may feel lightheaded.  If so, sit for a few minutes before standing.  Have someone help you get up.   6. Conscious sedation patients may resume a regular diet..  7. Any questions of medical nature, call your physician.    Cataract Surgery Postoperative Instructions  Dr. Jay Acharya      Postoperative  "Medications: After surgery, you will use eye drop medications. In most cases, you will start these eye drops 2 days before the day of surgery.   Follow the directions provided to you from the pharmacy or from the doctor's office.    The drops might sting a little when they are instilled, and that is normal.    It doesn't matter what order you put the drops into your eyes, but you should wait at least one minute between drops.    Recently we started using a compounded drop that contains all three prescriptions in one bottle. If you have this drop you only need to use one drop 4 time per day. Many people choose to do the drops at breakfast, lunch, dinner, and bedtime.    Artificial Tears- Are lubricating drops used to moisturize the eye.  You can use these as much as you want.  Particularly if your eyes feel watery, gritty, or uncomfortable.  Chilling these drops in the refrigerator results in a more soothing feeling.  There are several brands of artificial tears available including, but not limited to, Optive, Refresh, Systane, Blink, Genteal, Soothe, and others.  You should not use drops that are \"gets the red out.\"  You do not need a prescription for these medications.     Please continue any glaucoma, dry eye, or other medications you were using prior to the surgery.      Please allow 24 to 48 hours when requesting refills, and call BEFORE you run out of drops.    Restriction on Activities-  It is extremely important that you DO NOT RUB THE TREATED EYE.    - You will be given a clear plastic shield to wear as protection over your eye the night after surgery.    - Refrain from many activities that may put your eye at risk of injury, as well as areas containing a high volume of chemicals, dust, and debris.    - Do Not wear any eye makeup or moisturizer around the eye for 1 week after surgery.    - Do Not swim or go into a hot-tub, jacuzzi, or sauna for 1 week following surgery.  You can take showers as normal, but " "avoid getting shampoo or soap into your eyes.     - Avoid strenuous activity, including lifting more than 10 pounds, for 1 week after surgery.       - It is fine to bathe, read, watch TV, and use the computer.    Symptoms requiring medical attention:     - Sudden onset of increased discharge from the eye.  - Persistent or increasing pain in the eye.  - Sudden decrease in vision.  - Persistent nausea or vomiting.      If you have any questions or concerns before or after your surgery, please contact Dr. Acharya's office at [phone].         Revised 3/27/2017    Pending Results     No orders found from 2017 to 2017.            Admission Information     Date & Time Provider Department Dept. Phone    8/3/2017 Jay Acharya MD [facility] [phone]      Your Vitals Were     Blood Pressure Pulse Temperature Respirations Weight Pulse Oximetry    110/85 78 98.3  F (36.8  C) (Temporal) 16 82.6 kg (182 lb) 96%    BMI (Body Mass Index)                   28.51 kg/m2           MyChart Information     [employer] lets you send messages to your doctor, view your test results, renew your prescriptions, schedule appointments and more. To sign up, go to [email or web address] . Click on \"Log in\" on the left side of the screen, which will take you to the Welcome page. Then click on \"Sign up Now\" on the right side of the page.     You will be asked to enter the access code listed below, as well as some personal information. Please follow the directions to create your username and password.     Your access code is: [email or web address]  Expires: 10/18/2017 10:24 AM     Your access code will  in 90 days. If you need help or a new code, please call your [place] clinic or [phone].        Care EveryWhere ID     This is your Care EveryWhere ID. This could be used by other organizations to access your [place] medical records  [id number]        Equal Access to Services     JUMANA MOSLEY AH: " Hadii aad ku hadasho Soomaali, waaxda luqadaha, qaybta kaalmada aderhea, claudio yañezn efe berg. So [facility] [phone].    ATENCIÓN: Si ravinder figueredo, tiene a chong disposición servicios gratuitos de asistencia lingüística. Llame al [phone].    We comply with applicable federal civil rights laws and [place] laws. We do not discriminate on the basis of race, color, national origin, age, disability sex, sexual orientation or gender identity.               Review of your medicines      UNREVIEWED medicines. Ask your doctor about these medicines        Dose / Directions    adalimumab 40 MG/0.8ML prefilled syringe kit   Commonly known as:  HUMIRA        Dose:  40 mg   Inject 40 mg Subcutaneous every 14 days   Refills:  0       albuterol 108 (90 BASE) MCG/ACT Inhaler   Commonly known as:  PROAIR HFA/PROVENTIL HFA/VENTOLIN HFA        Dose:  2 puff   Inhale 2 puffs into the lungs every 4 hours as needed for shortness of breath / dyspnea or wheezing   Refills:  0       aspirin 81 MG EC tablet        Dose:  81 mg   Take 81 mg by mouth daily   Refills:  0       cetirizine 10 MG tablet   Commonly known as:  zyrTEC        Dose:  10 mg   Take 10 mg by mouth daily   Refills:  0       fluticasone 50 MCG/ACT spray   Commonly known as:  FLONASE        Dose:  2 spray   Spray 2 sprays into both nostrils daily   Refills:  0       FOLIC ACID PO        Dose:  1 mg   Take 1 mg by mouth daily   Refills:  0       HYDROcodone-acetaminophen 5-325 MG per tablet   Commonly known as:  NORCO        Dose:  1-2 tablet   Take 1-2 tablets by mouth every 6 hours as needed for moderate to severe pain   Refills:  0       METHOTREXATE PO   Indication:  Rheumatoid Arthritis        Dose:  2.5 mg   Take 2.5 mg by mouth 6/week all at one time once a week with dinner   Refills:  0       NITROSTAT SL        Dose:  0.4 mg   Place 0.4 mg under the tongue Place 1 tablet under the tongue every 5 minutes if needed for chest pain (x 3 doses).    Refills:  0       TOPROL XL PO        Dose:  25 mg   Take 25 mg by mouth daily   Refills:  0       ZOFRAN PO        Dose:  8 mg   Take 8 mg by mouth every 8 hours as needed for nausea or vomiting   Refills:  0                Protect others around you: Learn how to safely use, store and throw away your medicines at www.disposemymeds.org.             Medication List: This is a list of all your medications and when to take them. Check marks below indicate your daily home schedule. Keep this list as a reference.      Medications           Morning Afternoon Evening Bedtime As Needed    adalimumab 40 MG/0.8ML prefilled syringe kit   Commonly known as:  HUMIRA   Inject 40 mg Subcutaneous every 14 days                                albuterol 108 (90 BASE) MCG/ACT Inhaler   Commonly known as:  PROAIR HFA/PROVENTIL HFA/VENTOLIN HFA   Inhale 2 puffs into the lungs every 4 hours as needed for shortness of breath / dyspnea or wheezing                                aspirin 81 MG EC tablet   Take 81 mg by mouth daily                                cetirizine 10 MG tablet   Commonly known as:  zyrTEC   Take 10 mg by mouth daily                                fluticasone 50 MCG/ACT spray   Commonly known as:  FLONASE   Spray 2 sprays into both nostrils daily                                FOLIC ACID PO   Take 1 mg by mouth daily                                HYDROcodone-acetaminophen 5-325 MG per tablet   Commonly known as:  NORCO   Take 1-2 tablets by mouth every 6 hours as needed for moderate to severe pain                                METHOTREXATE PO   Take 2.5 mg by mouth 6/week all at one time once a week with dinner                                NITROSTAT SL   Place 0.4 mg under the tongue Place 1 tablet under the tongue every 5 minutes if needed for chest pain (x 3 doses).                                TOPROL XL PO   Take 25 mg by mouth daily                                ZOFRAN PO   Take 8 mg by mouth every 8 hours  as needed for nausea or vomiting

## 2017-08-03 NOTE — OP NOTE
PATIENT NAME:  Placido Dickerson    :  1965    PATIENT NUMBER:  [id number]    DATE OF SURGERY:  8/3/2017    SURGEON:  Jay Acharya M.D.    PREOPERATIVE DIAGNOSIS:   1. Cataract right eye  2. Astigmatism right eye    POSTOPERATIVE DIAGNOSIS:  Same    PROCEDURE:   1. Phacoemulusification of cataract with intraocular lens implant right eye.  2. Femtosecond LASER incisions (astigmatic correction) for cataract surgery right eye and nuclear softening.    DETAILS: The patient was brought to the LASER suite. In a supine position the head was secured and the docking apparatus was applied to the eye. When good suction was achieved BSS was added to the well. The chair was then positioned underneath the LASER and docking was successfully achieved. OCT scanning was initiated and approved. The LASER was then used to deliver the desired incisions. See scanned paper note for LASER parameters.     The patient was then moved to the operative suite in stable condition where the eye was prepped and draped in the usual sterile fashion.  A lid speculum was applied. A super sharp blade was used to create a paracentesis, through which 1% preservative free Lidocaine was injected.  Visoelastic was then used to inflate the anterior chamber.  A biplanar incision at the temporal limbus was created with a 2.4 mm keratome or if the main wound was made with the Femtosecond LASER it was bluntly dissected.  A continuous curvilinear capsulorrhexis was started with a cystitome and completed using Utrata forceps.  The lens was hydrodissected and hydro delineated using BSS on a cannula.  The lens nucleus was removed using phacoemulsification.  Remaining cortex was removed using irrigation and aspiration.  Viscoelastic was injected to inflate the capsular bag and a 22.5 D ZXR00 (Symfony) IOL (presbyopia correcting) was inserted into the capsular bag without difficulty.  The lens was easily rotated using a Sinskey hook and good centration  was noted.  Residual viscoelastic and provisc material was removed with irrigation and aspiration.  BSS was used to hydrate the corneal incision and paracentesis sites which were checked and noted to be watertight.  Tobradex ointment was applied to the eye and a clear plastic shield was placed.  The patient tolerated the procedure well and left the operative suite in stable condition.    Jay Acharya MD

## 2017-08-03 NOTE — ANESTHESIA POSTPROCEDURE EVALUATION
Patient: Placido Dickerson    Procedure(s):  RIGHT EYE FEMTOSECOND LASER ASSISTED, PHACOEMULSIFICATION CLEAR CORNEA WITH DELUXE INTRAOCULAR LENS IMPLANT  - Wound Class: I-Clean    Diagnosis:RIGHT EYE CATARACT  Diagnosis Additional Information: No value filed.    Anesthesia Type:  MAC    Note:  Anesthesia Post Evaluation    Patient location during evaluation: PACU  Patient participation: Able to fully participate in evaluation  Level of consciousness: awake and alert  Pain management: adequate  Airway patency: patent  Cardiovascular status: acceptable, hemodynamically stable and blood pressure returned to baseline  Respiratory status: acceptable  Hydration status: acceptable  PONV: none     Anesthetic complications: None          Last vitals:  Vitals:    08/03/17 1336 08/03/17 1430 08/03/17 1435   BP: 126/87 110/85 125/86   Pulse:  78 88   Resp: 16 16 16   Temp: 36.8  C (98.3  F)     SpO2: 95% 96% 96%         Electronically Signed By: Deborah Wolfe MD  August 3, 2017  3:44 PM

## 2017-08-03 NOTE — IP AVS SNAPSHOT
[facility]    [address]    [place] [address]    Phone:  [phone]    Fax:  [phone]                                       After Visit Summary   8/3/2017    Placido Dickerson    MRN: [id number]           After Visit Summary Signature Page     I have received my discharge instructions, and my questions have been answered. I have discussed any challenges I see with this plan with the nurse or doctor.    ..........................................................................................................................................  Patient/Patient Representative Signature      ..........................................................................................................................................  Patient Representative Print Name and Relationship to Patient    ..................................................               ................................................  Date                                            Time    ..........................................................................................................................................  Reviewed by Signature/Title    ...................................................              ..............................................  Date                                                            Time

## 2017-08-03 NOTE — ANESTHESIA PREPROCEDURE EVALUATION
Procedure: Procedure(s):  PHACOEMULSIFICATION CLEAR CORNEA WITH DELUXE INTRAOCULAR LENS IMPLANT  Preop diagnosis: RIGHT EYE CATARACT    Allergies   Allergen Reactions     Lidocaine Nausea     Past Medical History:   Diagnosis Date     Allergic rhinitis due to pollen      Asthma, mild intermittent      Biceps tendinitis of right shoulder      Borderline hyperlipidemia      CAD (coronary artery disease)      Chronic ankle pain      Diverticulosis      Hyperlipidemia      Lateral epicondylitis of left elbow      Left lateral epicondylitis      Onychomycosis of toenail      Peripheral neuropathy (H)      PPD positive, treated      PPD positive, treated      RA (rheumatoid arthritis) (H)      RA (rheumatoid arthritis) (H)      Seasonal allergic rhinitis      Sebaceous cyst      Sebaceous cyst     of right 2nd toe     Sicca syndrome (H)      Sicca syndrome (H)      SLAP tear of shoulder      Stented coronary artery      Subendocardial myocardial infarction (H)      Ulnar neuropathy at elbow of left upper extremity      Vitamin D deficiency      Past Surgical History:   Procedure Laterality Date     CARDIAC SURGERY       COLONOSCOPY       facial fracture surgery       GI SURGERY       heart catheterization, 2 stents       HERNIA REPAIR       KERATOTOMY ARCUATE WITH FEMTOSECOND LASER/IMAGING FOR ATIOL Left 7/20/2017    Procedure: KERATOTOMY ARCUATE WITH FEMTOSECOND LASER/IMAGING FOR ATIOL;  LEFT EYE FEMTOSECOND LASER CAPSULOTOMY, LENS FRAGMENTATION, ARCUATE INCISIONS;  Surgeon: Jay Acharya MD;  Location: [facility]     PHACOEMULSIFICATION CLEAR CORNEA WITH DELUXE INTRAOCULAR LENS IMPLANT Left 7/20/2017    Procedure: PHACOEMULSIFICATION CLEAR CORNEA WITH DELUXE INTRAOCULAR LENS IMPLANT;  LEFT EYE FEMTOSECOND LASER ASSISTED, PHACOEMULSIFICATION CLEAR CORNEA WITH DELUXE  INTRAOCULAR LENS IMPLANT ;  Surgeon: Jay Acharya MD;  Location: [facility]     Prior to Admission medications    Medication Sig Start Date End  Date Taking? Authorizing Provider   Atorvastatin Calcium (LIPITOR PO) Take 40 mg by mouth daily At bedtime    Reported, Patient   PREDNISONE PO Take 10 mg by mouth daily 5 tablets daily for 5 days, then 4/dx3, then 3/dx3, then 2/dx3,then 1/dx3    Reported, Patient   adalimumab (HUMIRA) 40 MG/0.8ML prefilled syringe kit Inject 40 mg Subcutaneous every 14 days    Reported, Patient   albuterol (PROAIR HFA/PROVENTIL HFA/VENTOLIN HFA) 108 (90 BASE) MCG/ACT Inhaler Inhale 2 puffs into the lungs every 4 hours as needed for shortness of breath / dyspnea or wheezing    Reported, Patient   aspirin 81 MG EC tablet Take 81 mg by mouth daily    Reported, Patient   cetirizine (ZYRTEC) 10 MG tablet Take 10 mg by mouth daily    Reported, Patient   fluticasone (FLONASE) 50 MCG/ACT spray Spray 2 sprays into both nostrils daily    Reported, Patient   FOLIC ACID PO Take 1 mg by mouth daily    Reported, Patient   HYDROcodone-acetaminophen (NORCO) 5-325 MG per tablet Take 1-2 tablets by mouth every 6 hours as needed for moderate to severe pain    Reported, Patient   METHOTREXATE PO Take 2.5 mg by mouth 6/week all at one time once a week with dinner    Reported, Patient   Metoprolol Succinate (TOPROL XL PO) Take 25 mg by mouth daily    Reported, Patient   Nitroglycerin (NITROSTAT SL) Place 0.4 mg under the tongue Place 1 tablet under the tongue every 5 minutes if needed for chest pain (x 3 doses).    Reported, Patient   Ondansetron HCl (ZOFRAN PO) Take 8 mg by mouth every 8 hours as needed for nausea or vomiting    Reported, Patient     No current Epic-ordered facility-administered medications on file.      Current Outpatient Prescriptions Ordered in Epic   Medication     Atorvastatin Calcium (LIPITOR PO)     PREDNISONE PO     adalimumab (HUMIRA) 40 MG/0.8ML prefilled syringe kit     albuterol (PROAIR HFA/PROVENTIL HFA/VENTOLIN HFA) 108 (90 BASE) MCG/ACT Inhaler     aspirin 81 MG EC tablet     cetirizine (ZYRTEC) 10 MG tablet      fluticasone (FLONASE) 50 MCG/ACT spray     FOLIC ACID PO     HYDROcodone-acetaminophen (NORCO) 5-325 MG per tablet     METHOTREXATE PO     Metoprolol Succinate (TOPROL XL PO)     Nitroglycerin (NITROSTAT SL)     Ondansetron HCl (ZOFRAN PO)     Wt Readings from Last 1 Encounters:   07/20/17 82.6 kg (182 lb)     Temp Readings from Last 1 Encounters:   07/20/17 36.5  C (97.7  F) (Temporal)     BP Readings from Last 6 Encounters:   07/20/17 124/90     Pulse Readings from Last 4 Encounters:   No data found for Pulse     Resp Readings from Last 1 Encounters:   07/20/17 14     SpO2 Readings from Last 1 Encounters:   07/20/17 98%       RECENT LABS:   ECG:   ECHO:   CXR:        Anesthesia Evaluation     . Pt has had prior anesthetic.     No history of anesthetic complications          ROS/MED HX    ENT/Pulmonary:     (+)asthma , . .   (-) tobacco use, COPD, sleep apnea and MARYJANE risk factors   Neurologic:     (+)neuropathy     Cardiovascular:     (+) Dyslipidemia, hypertension--CAD, -past MI (subendocardial mi, stent, no current symptoms, occured in 2010),-stent,. : . . . :. .      (-) arrhythmias and valvular problems/murmurs   METS/Exercise Tolerance:  >4 METS   Hematologic:        (-) history of blood clots, anemia and other hematologic disorder   Musculoskeletal:   (+) arthritis, , , other musculoskeletal- SICCA syndrome-rheumatoid arthritis      GI/Hepatic:     (+) GERD Symptomatic,      (-) liver disease   Renal/Genitourinary:      (-) renal disease   Endo:      (-) Type I DM, Type II DM and thyroid disease   Psychiatric:     (+) psychiatric history anxiety      Infectious Disease:        (-) Recent Fever   Malignancy:         Other: Comment: Patient denies allergy to lidocaine, gets at dentist all the time                    Physical Exam  Normal systems: cardiovascular and pulmonary    Airway   Mallampati: II  TM distance: >3 FB  Neck ROM: full    Dental   (+) caps    Cardiovascular   Rhythm and rate: regular and  normal  (-) no murmur    Pulmonary    breath sounds clear to auscultation                    Anesthesia Plan      History & Physical Review      ASA Status:  3 .    NPO Status:  > 8 hours    Plan for MAC Reason for MAC:  Deep or markedly invasive procedure (G8)  PONV prophylaxis:  Ondansetron (or other 5HT-3)  Precedex  Midaz, fentanyl as needed.       Postoperative Care      Consents  Anesthetic plan, risks, benefits and alternatives discussed with:  Patient..                          .

## 2017-08-03 NOTE — DISCHARGE INSTRUCTIONS
"[facility] Discharge  Instructions  Anesthesia ([facility])   Adult Discharge Instructions    For 24 hours after surgery    1. Get plenty of rest.  Make arrangements to have a responsible adult stay with you for at least 6 hours after you leave the hospital.  2. Do not drive or use heavy equipment for 24 hours.    3. Do not drink alcohol for 24 hours.  4. Do not sign legal documents or make important decisions for 24 hours.  5. Avoid strenuous or risky activities. You may feel lightheaded.  If so, sit for a few minutes before standing.  Have someone help you get up.   6. Conscious sedation patients may resume a regular diet..  7. Any questions of medical nature, call your physician.    Cataract Surgery Postoperative Instructions  Dr. Jay Acharya      Postoperative Medications: After surgery, you will use eye drop medications. In most cases, you will start these eye drops 2 days before the day of surgery.   Follow the directions provided to you from the pharmacy or from the doctor's office.    The drops might sting a little when they are instilled, and that is normal.    It doesn't matter what order you put the drops into your eyes, but you should wait at least one minute between drops.    Recently we started using a compounded drop that contains all three prescriptions in one bottle. If you have this drop you only need to use one drop 4 time per day. Many people choose to do the drops at breakfast, lunch, dinner, and bedtime.    Artificial Tears- Are lubricating drops used to moisturize the eye.  You can use these as much as you want.  Particularly if your eyes feel watery, gritty, or uncomfortable.  Chilling these drops in the refrigerator results in a more soothing feeling.  There are several brands of artificial tears available including, but not limited to, Optive, Refresh, Systane, Blink, Genteal, Soothe, and others.  You should not use drops that are \"gets the red " "out.\"  You do not need a prescription for these medications.     Please continue any glaucoma, dry eye, or other medications you were using prior to the surgery.      Please allow 24 to 48 hours when requesting refills, and call BEFORE you run out of drops.    Restriction on Activities-  It is extremely important that you DO NOT RUB THE TREATED EYE.    - You will be given a clear plastic shield to wear as protection over your eye the night after surgery.    - Refrain from many activities that may put your eye at risk of injury, as well as areas containing a high volume of chemicals, dust, and debris.    - Do Not wear any eye makeup or moisturizer around the eye for 1 week after surgery.    - Do Not swim or go into a hot-tub, jacuzzi, or sauna for 1 week following surgery.  You can take showers as normal, but avoid getting shampoo or soap into your eyes.     - Avoid strenuous activity, including lifting more than 10 pounds, for 1 week after surgery.       - It is fine to bathe, read, watch TV, and use the computer.    Symptoms requiring medical attention:     - Sudden onset of increased discharge from the eye.  - Persistent or increasing pain in the eye.  - Sudden decrease in vision.  - Persistent nausea or vomiting.      If you have any questions or concerns before or after your surgery, please contact Dr. Acharya's office at [phone].         Revised 3/27/2017  "

## 2024-09-30 DIAGNOSIS — H53.10 SUBJECTIVE VISUAL DISTURBANCE: Primary | ICD-10-CM

## (undated) DEVICE — GLOVE PROTEXIS MICRO 7.0  2D73PM70

## (undated) DEVICE — GLOVE PROTEXIS MICRO 6.0  2D73PM60

## (undated) DEVICE — PACK CATARACT CUSTOM SO DALE SEY32CTFCX

## (undated) DEVICE — EYE SHIELD PLASTIC

## (undated) DEVICE — EYE SOL BSS 500ML

## (undated) DEVICE — EYE PACK CUSTOM ANTERIOR 30DEG TIP CENTURION PPK6682-04

## (undated) DEVICE — EYE TIP IRRIGATION & ASPIRATION POLYMER 35D BENT 8065751511

## (undated) DEVICE — GLOVE PROTEXIS MICRO 8.0  2D73PM80

## (undated) DEVICE — EYE PACK BVI READYPAK KIT #1

## (undated) DEVICE — LINEN TOWEL PACK X5 5464

## (undated) DEVICE — EYE KNIFE SLIT XSTAR VISITEC 2.4MM 45DEG BEVEL UP 373724

## (undated) RX ORDER — LIDOCAINE HYDROCHLORIDE 10 MG/ML
INJECTION, SOLUTION EPIDURAL; INFILTRATION; INTRACAUDAL; PERINEURAL
Status: DISPENSED
Start: 2017-08-03

## (undated) RX ORDER — TETRACAINE HYDROCHLORIDE 5 MG/ML
SOLUTION OPHTHALMIC
Status: DISPENSED
Start: 2017-08-03